# Patient Record
Sex: FEMALE | Race: WHITE | Employment: FULL TIME | URBAN - METROPOLITAN AREA
[De-identification: names, ages, dates, MRNs, and addresses within clinical notes are randomized per-mention and may not be internally consistent; named-entity substitution may affect disease eponyms.]

---

## 2017-01-03 RX ORDER — AMLODIPINE BESYLATE 5 MG/1
TABLET ORAL
Qty: 30 TABLET | Refills: 0 | Status: ON HOLD | OUTPATIENT
Start: 2017-01-03 | End: 2018-04-16

## 2018-04-11 ENCOUNTER — TELEPHONE (OUTPATIENT)
Dept: CARDIOLOGY CLINIC | Age: 63
End: 2018-04-11

## 2018-04-19 ENCOUNTER — TELEPHONE (OUTPATIENT)
Dept: CARDIOLOGY CLINIC | Age: 63
End: 2018-04-19

## 2018-05-18 ENCOUNTER — OFFICE VISIT (OUTPATIENT)
Dept: CARDIOLOGY CLINIC | Age: 63
End: 2018-05-18

## 2018-05-18 VITALS
HEART RATE: 64 BPM | DIASTOLIC BLOOD PRESSURE: 62 MMHG | WEIGHT: 177 LBS | SYSTOLIC BLOOD PRESSURE: 120 MMHG | BODY MASS INDEX: 26.83 KG/M2 | HEIGHT: 68 IN

## 2018-05-18 DIAGNOSIS — I20.1 CORONARY VASOSPASM (HCC): Primary | ICD-10-CM

## 2018-05-18 DIAGNOSIS — E78.2 MIXED HYPERLIPIDEMIA: ICD-10-CM

## 2018-05-18 DIAGNOSIS — I10 ESSENTIAL HYPERTENSION: ICD-10-CM

## 2018-05-18 PROCEDURE — 99214 OFFICE O/P EST MOD 30 MIN: CPT | Performed by: NURSE PRACTITIONER

## 2018-05-18 RX ORDER — DICYCLOMINE HYDROCHLORIDE 10 MG/1
10 CAPSULE ORAL 4 TIMES DAILY PRN
COMMUNITY
End: 2022-01-21 | Stop reason: ALTCHOICE

## 2018-08-16 NOTE — TELEPHONE ENCOUNTER
Medication Refill    When was your last appointment with cardiology?  (if 1year or longer, please schedule an appointment) 5/18/2018    Medication needing refilled:isosorbide mononitrate (IMDUR) 30 MG extended release tablet   Take 1 tablet by mouth daily    Patient want a 30 or 90 day supply called in: 30 day     Which Pharmacy are we sending the medication to: [facility] [address] [place] [address], [address] [phone]

## 2018-08-21 RX ORDER — ISOSORBIDE MONONITRATE 30 MG/1
30 TABLET, EXTENDED RELEASE ORAL DAILY
Qty: 30 TABLET | Refills: 3 | Status: SHIPPED | OUTPATIENT
Start: 2018-08-21 | End: 2018-08-21

## 2018-08-21 RX ORDER — ISOSORBIDE MONONITRATE 30 MG/1
30 TABLET, EXTENDED RELEASE ORAL DAILY
Qty: 90 TABLET | Refills: 3 | Status: SHIPPED | OUTPATIENT
Start: 2018-08-21 | End: 2018-11-20 | Stop reason: DRUGHIGH

## 2018-09-05 ENCOUNTER — TELEPHONE (OUTPATIENT)
Dept: CARDIOLOGY CLINIC | Age: 63
End: 2018-09-05

## 2018-09-05 RX ORDER — AMLODIPINE BESYLATE 2.5 MG/1
2.5 TABLET ORAL DAILY
Qty: 90 TABLET | Refills: 3 | Status: SHIPPED | OUTPATIENT
Start: 2018-09-05 | End: 2018-11-20 | Stop reason: ALTCHOICE

## 2018-11-20 ENCOUNTER — HOSPITAL ENCOUNTER (OUTPATIENT)
Age: 63
Discharge: HOME | End: 2018-11-20
Payer: COMMERCIAL

## 2018-11-20 ENCOUNTER — OFFICE VISIT (OUTPATIENT)
Dept: CARDIOLOGY CLINIC | Age: 63
End: 2018-11-20
Payer: COMMERCIAL

## 2018-11-20 VITALS
SYSTOLIC BLOOD PRESSURE: 112 MMHG | HEIGHT: 68 IN | HEART RATE: 65 BPM | BODY MASS INDEX: 28.51 KG/M2 | WEIGHT: 188.1 LBS | OXYGEN SATURATION: 97 % | DIASTOLIC BLOOD PRESSURE: 62 MMHG

## 2018-11-20 DIAGNOSIS — R53.83 OTHER FATIGUE: ICD-10-CM

## 2018-11-20 DIAGNOSIS — E78.2 MIXED HYPERLIPIDEMIA: ICD-10-CM

## 2018-11-20 DIAGNOSIS — I20.1 CORONARY VASOSPASM (HCC): Primary | ICD-10-CM

## 2018-11-20 DIAGNOSIS — I10 ESSENTIAL HYPERTENSION: ICD-10-CM

## 2018-11-20 LAB
HCT VFR BLD CALC: 39.8 % (ref 36–48)
HEMOGLOBIN: 12.8 G/DL (ref 12–16)
MCH RBC QN AUTO: 28.6 PG (ref 26–34)
MCHC RBC AUTO-ENTMCNC: 32.2 G/DL (ref 31–36)
MCV RBC AUTO: 88.6 FL (ref 80–100)
PDW BLD-RTO: 14.7 % (ref 12.4–15.4)
PLATELET # BLD: 284 K/UL (ref 135–450)
PMV BLD AUTO: 9.2 FL (ref 5–10.5)
RBC # BLD: 4.49 M/UL (ref 4–5.2)
T4 FREE: 1.1 NG/DL (ref 0.9–1.8)
TSH SERPL DL<=0.05 MIU/L-ACNC: 2.8 UIU/ML (ref 0.27–4.2)
WBC # BLD: 4.2 K/UL (ref 4–11)

## 2018-11-20 PROCEDURE — 99214 OFFICE O/P EST MOD 30 MIN: CPT | Performed by: NURSE PRACTITIONER

## 2018-11-20 PROCEDURE — 36415 COLL VENOUS BLD VENIPUNCTURE: CPT

## 2018-11-20 PROCEDURE — 85027 COMPLETE CBC AUTOMATED: CPT

## 2018-11-20 PROCEDURE — 84443 ASSAY THYROID STIM HORMONE: CPT

## 2018-11-20 PROCEDURE — 93000 ELECTROCARDIOGRAM COMPLETE: CPT | Performed by: NURSE PRACTITIONER

## 2018-11-20 PROCEDURE — 84439 ASSAY OF FREE THYROXINE: CPT

## 2018-11-20 RX ORDER — ISOSORBIDE MONONITRATE 30 MG/1
15 TABLET, EXTENDED RELEASE ORAL DAILY
Qty: 90 TABLET | Refills: 3 | Status: SHIPPED
Start: 2018-11-20 | End: 2019-09-06 | Stop reason: SDUPTHER

## 2018-11-20 RX ORDER — IBUPROFEN 800 MG/1
800 TABLET ORAL EVERY 6 HOURS PRN
COMMUNITY

## 2018-11-20 NOTE — PROGRESS NOTES
[address]     Outpatient Follow Up Note    Lissy Thompson is 66 y.o. female who presents today with a history of NQWMI d/t spasm (RCA by cath '11), LBBB, HTN and hyperlipidemia . CHIEF COMPLAINT / HPI:  Follow Up secondary to cor spasm    Subjective:   she denies significant chest pain. Her amlodipine was stopped a few months ago d/t low BP with c/o fatigue. She continues to c/o fatigue by midday. She's had no angina since stopping norvasc. There is no SOB/WASHBURN. The patient denies orthopnea/PND. The patient does not have swelling. The patients weight is stable . The patient is not experiencing palpitations or dizziness. These symptoms are stable since her last OV. With regard to medication therapy the patient has been compliant with prescribed regimen. They have tolerated therapy to date. Past Medical History:   Diagnosis Date    Arthritis     CAD (coronary artery disease)     Angiogram 3/2011 performed DR Duncan Andersen showing ostial RCA spasm. Cath 3/2014 patent coronaries, normal LVEF.      Hyperlipidemia     on statin    Hypotension     Myocardial infarction ([address]     likely secondary to spasm    Noncompliance     with medications in past    Shingles      Social History:    History   Smoking Status    Never Smoker   Smokeless Tobacco    Never Used     Current Medications:  Current Outpatient Prescriptions   Medication Sig Dispense Refill    ibuprofen (ADVIL;MOTRIN) 800 MG tablet Take 800 mg by mouth every 6 hours as needed for Pain      isosorbide mononitrate (IMDUR) 30 MG extended release tablet Take 1 tablet by mouth daily 90 tablet 3    dicyclomine (BENTYL) 10 MG capsule Take 10 mg by mouth 4 times daily (before meals and nightly) prn      vitamin D (CHOLECALCIFEROL) 1000 UNIT TABS tablet Take 3,000 Units by mouth daily      pravastatin (PRAVACHOL) 20 MG tablet TAKE 1 TABLET BY MOUTH DAILY 30 tablet 0    aspirin (ASPIRIN LOW DOSE) 81 MG TBEC TAKE 1 TABLET BY MOUTH EVERY DAY

## 2019-01-18 ENCOUNTER — OFFICE VISIT (OUTPATIENT)
Dept: CARDIOLOGY CLINIC | Age: 64
End: 2019-01-18
Payer: COMMERCIAL

## 2019-01-18 VITALS
SYSTOLIC BLOOD PRESSURE: 120 MMHG | HEART RATE: 64 BPM | HEIGHT: 68 IN | OXYGEN SATURATION: 96 % | DIASTOLIC BLOOD PRESSURE: 72 MMHG | BODY MASS INDEX: 27.81 KG/M2 | WEIGHT: 183.5 LBS

## 2019-01-18 DIAGNOSIS — I10 ESSENTIAL HYPERTENSION: ICD-10-CM

## 2019-01-18 DIAGNOSIS — E78.2 MIXED HYPERLIPIDEMIA: ICD-10-CM

## 2019-01-18 DIAGNOSIS — I20.1 CORONARY VASOSPASM (HCC): Primary | ICD-10-CM

## 2019-01-18 PROCEDURE — 99214 OFFICE O/P EST MOD 30 MIN: CPT | Performed by: NURSE PRACTITIONER

## 2019-03-08 RX ORDER — ISOSORBIDE MONONITRATE 30 MG/1
30 TABLET, EXTENDED RELEASE ORAL DAILY
Qty: 90 TABLET | Refills: 1 | OUTPATIENT
Start: 2019-03-08

## 2019-09-06 RX ORDER — ISOSORBIDE MONONITRATE 30 MG/1
30 TABLET, EXTENDED RELEASE ORAL DAILY
Qty: 90 TABLET | Refills: 3 | Status: SHIPPED | OUTPATIENT
Start: 2019-09-06 | End: 2020-12-01 | Stop reason: SDUPTHER

## 2019-09-06 RX ORDER — ISOSORBIDE MONONITRATE 30 MG/1
15 TABLET, EXTENDED RELEASE ORAL DAILY
Qty: 90 TABLET | Refills: 3 | Status: SHIPPED | OUTPATIENT
Start: 2019-09-06 | End: 2019-09-06

## 2019-09-09 ENCOUNTER — TELEPHONE (OUTPATIENT)
Dept: CARDIOLOGY CLINIC | Age: 64
End: 2019-09-09

## 2019-11-13 ENCOUNTER — OFFICE VISIT (OUTPATIENT)
Dept: CARDIOLOGY CLINIC | Age: 64
End: 2019-11-13
Payer: COMMERCIAL

## 2019-11-13 VITALS
HEART RATE: 67 BPM | DIASTOLIC BLOOD PRESSURE: 60 MMHG | HEIGHT: 68 IN | WEIGHT: 187.4 LBS | SYSTOLIC BLOOD PRESSURE: 108 MMHG | BODY MASS INDEX: 28.4 KG/M2 | OXYGEN SATURATION: 97 %

## 2019-11-13 DIAGNOSIS — I20.1 CORONARY VASOSPASM (HCC): Primary | ICD-10-CM

## 2019-11-13 DIAGNOSIS — I10 ESSENTIAL HYPERTENSION: ICD-10-CM

## 2019-11-13 DIAGNOSIS — Z13.820 SCREENING FOR OSTEOPOROSIS: ICD-10-CM

## 2019-11-13 DIAGNOSIS — E78.2 MIXED HYPERLIPIDEMIA: ICD-10-CM

## 2019-11-13 PROCEDURE — 99214 OFFICE O/P EST MOD 30 MIN: CPT | Performed by: NURSE PRACTITIONER

## 2019-12-05 ENCOUNTER — TELEPHONE (OUTPATIENT)
Dept: CARDIOLOGY CLINIC | Age: 64
End: 2019-12-05

## 2019-12-06 ENCOUNTER — TELEPHONE (OUTPATIENT)
Dept: CARDIOLOGY CLINIC | Age: 64
End: 2019-12-06

## 2020-08-12 ENCOUNTER — TELEPHONE (OUTPATIENT)
Dept: CARDIOLOGY CLINIC | Age: 65
End: 2020-08-12

## 2020-08-12 NOTE — TELEPHONE ENCOUNTER
CARDIAC CLEARANCE     What type of procedure are you having? Hand sx   Which physician is performing your procedure? Dr David Kohli    When is your procedure scheduled for? Not schedule yet  Where are you having this procedure? [facility]  Are you taking Blood Thinners? Aspirin   If so what? (Name/dose/frequesncy)     Does the surgeon want you to stop your blood thinner? YES If so for how long?   Up to cardiologist    Phone Number and Contact Name for Physicians office: [phone]     Fax number to send information: [phone] attn : Chino José

## 2020-08-17 NOTE — TELEPHONE ENCOUNTER
Patient calling again about clearance letter . Can letter be faxed to number given on previous message ? Call patient to let her know this was done.

## 2020-08-17 NOTE — TELEPHONE ENCOUNTER
Spoke to the pt-the surgeon is asking for clearance. She will fax the letter to our office. Please call to make an appointment for clearance.

## 2020-08-18 ENCOUNTER — OFFICE VISIT (OUTPATIENT)
Dept: CARDIOLOGY CLINIC | Age: 65
End: 2020-08-18
Payer: COMMERCIAL

## 2020-08-18 VITALS
BODY MASS INDEX: 29.08 KG/M2 | HEIGHT: 68 IN | OXYGEN SATURATION: 99 % | WEIGHT: 191.9 LBS | SYSTOLIC BLOOD PRESSURE: 120 MMHG | DIASTOLIC BLOOD PRESSURE: 70 MMHG | HEART RATE: 60 BPM

## 2020-08-18 PROCEDURE — 99214 OFFICE O/P EST MOD 30 MIN: CPT | Performed by: NURSE PRACTITIONER

## 2020-08-18 RX ORDER — ASCORBIC ACID 500 MG
500 TABLET ORAL DAILY
COMMUNITY

## 2020-08-18 NOTE — PROGRESS NOTES
[facility]     Outpatient Follow Up Note    Leticia Flanagan is 63 y.o. female who presents today with a history of NQWMI d/t spasm (RCA by cath '11), LBBB, HTN and hyperlipidemia . CHIEF COMPLAINT / HPI:  Follow Up secondary to seeking clearance for orthopedic surgery scheduled for 9/4    Subjective:     Her tendon is no longer supporting her Lt thumb. It pops out of place and is painful. Its all related to her arthritis. she denies significant chest pain. There is no SOB/WASHBURN. The patient denies orthopnea/PND. The patient does not have swelling. The patients weight is stable . The patient is not experiencing palpitations or dizziness. She continues to c/o fatigue by midday however gets up at 3-4 am. She doesn't snore so does not think she has apnea. These symptoms are stable since her last OV. With regard to medication therapy the patient has been compliant with prescribed regimen. They have tolerated therapy to date. Past Medical History:   Diagnosis Date    Arthritis     CAD (coronary artery disease)     Angiogram 3/2011 performed DR Zay Carcamo showing ostial RCA spasm. Cath 3/2014 patent coronaries, normal LVEF.      Hyperlipidemia     on statin    Hypotension     Myocardial infarction ([facility] [address] [facility]     likely secondary to spasm    Noncompliance     with medications in past    Shingles      Social History:    Social History     Tobacco Use   Smoking Status Never Smoker   Smokeless Tobacco Never Used     Current Medications:  Current Outpatient Medications   Medication Sig Dispense Refill    vitamin C (ASCORBIC ACID) 500 MG tablet Take 500 mg by mouth daily QD (UNSURE OF MG)      isosorbide mononitrate (IMDUR) 30 MG extended release tablet TAKE 1 TABLET BY MOUTH DAILY (Patient taking differently: Take 15 mg by mouth daily ) 90 tablet 3    ibuprofen (ADVIL;MOTRIN) 800 MG tablet Take 800 mg by mouth every 6 hours as needed for Pain      dicyclomine (BENTYL) 10 MG capsule Take 10 mg by mouth 4 times daily as needed prn       vitamin D (CHOLECALCIFEROL) 1000 UNIT TABS tablet Take 3,000 Units by mouth daily      pravastatin (PRAVACHOL) 20 MG tablet TAKE 1 TABLET BY MOUTH DAILY 30 tablet 0    aspirin (ASPIRIN LOW DOSE) 81 MG TBEC TAKE 1 TABLET BY MOUTH EVERY DAY 30 tablet 6    therapeutic multivitamin-minerals (THERAGRAN-M) tablet Take 1 tablet by mouth daily. No current facility-administered medications for this visit. REVIEW OF SYSTEMS:    CONSTITUTIONAL: No major weight gain or loss, + midday fatigue, - weakness, night sweats or fever. HEENT: No new vision difficulties or ringing in the ears. RESPIRATORY: No new SOB, PND, orthopnea or cough. CARDIOVASCULAR: See HPI  GI: No nausea, vomiting, diarrhea, constipation, abdominal pain or changes in bowel habits; hx IBS. : No urinary frequency, urgency, incontinence hematuria or dysuria. SKIN: No cyanosis or skin lesions. MUSCULOSKELETAL: No new muscle or joint pain; CLBP. NEUROLOGICAL: No syncope or TIA-like symptoms. PSYCHIATRIC: No anxiety, pain, insomnia or depression    Objective:   PHYSICAL EXAM:        Vitals:    08/18/20 1519 08/18/20 1536   BP: 92/62 120/70   Site: Right Upper Arm    Position: Sitting    Cuff Size: Large Adult    Pulse: 60    SpO2: 99%    Weight: 191 lb 14.4 oz (87 kg)    Height: 5' 8\" (1.727 m)        VITALS:  BP 92/62 (Site: Right Upper Arm, Position: Sitting, Cuff Size: Large Adult)   Pulse 60   Ht 5' 8\" (1.727 m)   Wt 191 lb 14.4 oz (87 kg)   SpO2 99%   BMI 29.18 kg/m²   CONSTITUTIONAL: Cooperative, no apparent distress, and appears well nourished / developed  NEUROLOGIC:  Awake and orientated to person, place and time. PSYCH: Calm affect; very pleasant lady. SKIN: Warm and dry. HEENT: Sclera non-icteric, normocephalic, neck supple, no elevation of JVP, normal carotid pulses with no bruits and thyroid normal size.   LUNGS:  No increased work of breathing and clear to auscultation, no crackles or ~stable : denies chest pain/angina  ~remains on isosorbide  ~neg nuclear stress for ischemia April '18    2. Essential hypertension   ~controlled     3. Mixed hyperlipidemia   ~well controlled by profile from May '19  ~high HDL  ~pravastatin   ~labs followed by PCP        I had the opportunity to review the clinical symptoms and presentation of Anamaria Bueno. Plan:     1. Echocardiogram in the near future (would like to have before anticipated thumb and back surgery)  2. F/U in one year    Overall the patient is stable from CV standpoint    I have addresed the patient's cardiac risk factors and adjusted pharmacologic treatment as needed. In addition, I have reinforced the need for patient directed risk factor modification. Further evaluation will be based upon the patient's clinical course and testing results. All questions and concerns were addressed to the patient. Alternatives to my treatment were discussed. The patient is not currently smoking. The risks related to smoking were reviewed with the patient. Recommend maintaining a smoke-free lifestyle. Patient is not on a beta-blocker: cor spasm  Patient is not on an ace-i/ARB: neg CHF  Patient is on a statin     Antiplatelet therapy has been recommended / prescribed for this patient. Education conducted on adverse reactions including bleeding was discussed. The patient verbalizes understanding not to stop medications without discussing with us. Discussed exercise: 30-60 minutes 7 days/week  Discussed Low saturated fat diet. Thank you for allowing to us to participate in the care of Anamaria Bueno.     LB Rahman    Documentation of today's visit sent to PCP

## 2020-08-20 ENCOUNTER — HOSPITAL ENCOUNTER (OUTPATIENT)
Dept: NON INVASIVE DIAGNOSTICS | Age: 65
Discharge: HOME | End: 2020-08-20
Payer: COMMERCIAL

## 2020-08-20 LAB
LV EF: 55 %
LVEF MODALITY: NORMAL

## 2020-08-20 PROCEDURE — 93306 TTE W/DOPPLER COMPLETE: CPT

## 2020-08-25 ENCOUNTER — TELEPHONE (OUTPATIENT)
Dept: CARDIOLOGY CLINIC | Age: 65
End: 2020-08-25

## 2020-08-25 NOTE — LETTER
[address] [facility] [address] [address]  Phone: [phone]  Fax: [phone]    LB Rahman CNP        August 25, 2020     Patient: Shannon Collado   YOB: 1955   Date of Visit: 8/25/2020       To Whom It May Concern: It is my medical opinion that Deatrice Seats, may proceed with thumb surgery. If you have any questions or concerns, please don't hesitate to call.     Sincerely,        LB Rahman CNP

## 2020-08-25 NOTE — TELEPHONE ENCOUNTER
Calling to get results of echo done last Thursday at [facility] . She is having the EKG she had done last week faxed for NPTS to review before writing clearance letter .  Please fax letter to  thierno Rodríguez fax  attention Den Jimenez

## 2020-12-01 NOTE — TELEPHONE ENCOUNTER
Last ov Ts     1. Coronary vasospasm (HCC)   ~stable : denies chest pain/angina  ~remains on isosorbide  ~neg nuclear stress for ischemia April '18     2. Essential hypertension   ~controlled      3.  Mixed hyperlipidemia   ~well controlled by profile from May '19  ~high HDL  ~pravastatin   ~labs followed by PCP

## 2020-12-02 ENCOUNTER — TELEPHONE (OUTPATIENT)
Dept: CARDIOLOGY CLINIC | Age: 65
End: 2020-12-02

## 2020-12-02 RX ORDER — ISOSORBIDE MONONITRATE 30 MG/1
15 TABLET, EXTENDED RELEASE ORAL SEE ADMIN INSTRUCTIONS
Qty: 45 TABLET | Refills: 3 | Status: SHIPPED | OUTPATIENT
Start: 2020-12-02 | End: 2022-02-08

## 2020-12-02 NOTE — TELEPHONE ENCOUNTER
Pharmacy calling for direction on Imdur 30 mg  Tablets. Per NPTS she is to take 0.5 mg tablets twice daily. Called and spoke with pharmacist and informed him of the directions.

## 2020-12-17 ENCOUNTER — TELEPHONE (OUTPATIENT)
Dept: CARDIOLOGY CLINIC | Age: 65
End: 2020-12-17

## 2020-12-17 NOTE — TELEPHONE ENCOUNTER
CARDIAC CLEARANCE     What type of procedure are you having? Left SI Joint Fixation    Which physician is performing your procedure? Dr Yogi Pepper    When is your procedure scheduled for? Not yet ki looking to do first of 2021    Where are you having this procedure? [facility]    Are you taking Blood Thinners? Yes   If so what? ASA (Name/dose/frequesncy)     Does the surgeon want you to stop your blood thinner? If so for how long?     Phone Number and Contact Name for Physicians office: [phone]    Fax number to send information: [phone]

## 2021-01-20 ENCOUNTER — TELEPHONE (OUTPATIENT)
Dept: CARDIOLOGY CLINIC | Age: 66
End: 2021-01-20

## 2021-01-20 NOTE — TELEPHONE ENCOUNTER
Last office note 8/18/20:    Assessment:        Diagnosis Orders   1. Coronary vasospasm (HCC)   ~stable : denies chest pain/angina  ~remains on isosorbide  ~neg nuclear stress for ischemia April '18     2. Essential hypertension   ~controlled      3.  Mixed hyperlipidemia   ~well controlled by profile from May '19  ~high HDL  ~pravastatin   ~labs followed by PCP        She was also requesting clearance in August

## 2021-01-20 NOTE — TELEPHONE ENCOUNTER
CARDIAC CLEARANCE     What type of procedure are you having? SI Fusion    Which physician is performing your procedure? Dr Jennifer De Oliveira    When is your procedure scheduled for? 02/2/2021    Where are you having this procedure? [facility]    Are you taking Blood Thinners? yes   If so what? ASA (Name/dose/frequesncy)     Does the surgeon want you to stop your blood thinner? If so for how long?  Needs to stop 5-7 days prior    Phone Number and Contact Name for Physicians office: [phone]    Fax number to send information: [phone]

## 2021-01-22 ENCOUNTER — TELEPHONE (OUTPATIENT)
Dept: CARDIOLOGY CLINIC | Age: 66
End: 2021-01-22

## 2021-01-22 NOTE — TELEPHONE ENCOUNTER
[facility] is sending a new clearance paper work over because the other states sx is in patient, but procedure is going to be out patient and they need new  Clearance form filled out signed and faxed back. Any questions please call.

## 2022-01-21 ENCOUNTER — OFFICE VISIT (OUTPATIENT)
Dept: CARDIOLOGY CLINIC | Age: 67
End: 2022-01-21
Payer: COMMERCIAL

## 2022-01-21 VITALS
SYSTOLIC BLOOD PRESSURE: 110 MMHG | DIASTOLIC BLOOD PRESSURE: 78 MMHG | WEIGHT: 191 LBS | HEART RATE: 65 BPM | BODY MASS INDEX: 28.95 KG/M2 | OXYGEN SATURATION: 98 % | HEIGHT: 68 IN

## 2022-01-21 DIAGNOSIS — I10 ESSENTIAL HYPERTENSION: ICD-10-CM

## 2022-01-21 DIAGNOSIS — I20.1 CORONARY VASOSPASM (HCC): Primary | ICD-10-CM

## 2022-01-21 DIAGNOSIS — E78.2 MIXED HYPERLIPIDEMIA: ICD-10-CM

## 2022-01-21 PROCEDURE — 99214 OFFICE O/P EST MOD 30 MIN: CPT | Performed by: NURSE PRACTITIONER

## 2022-01-21 PROCEDURE — 93000 ELECTROCARDIOGRAM COMPLETE: CPT | Performed by: NURSE PRACTITIONER

## 2022-01-21 RX ORDER — PRAVASTATIN SODIUM 20 MG
TABLET ORAL
Qty: 90 TABLET | Refills: 3 | Status: SHIPPED | OUTPATIENT
Start: 2022-01-21
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
## 2022-09-21 RX ORDER — ISOSORBIDE MONONITRATE 30 MG/1
TABLET, EXTENDED RELEASE ORAL
Qty: 45 TABLET | Refills: 1 | Status: SHIPPED | OUTPATIENT
Start: 2022-09-21

## 2024-03-18 NOTE — COMMUNICATION BODY
Attempted to call the patient's daughter but phone number is not working.    kg/m²    CONSTITUTIONAL: Cooperative, no apparent distress, and appears well nourished / developed  NEUROLOGIC:  Awake and orientated to person, place and time. PSYCH: Calm affect. SKIN: Warm and dry. HEENT: Sclera non-icteric, normocephalic, neck supple, no elevation of JVP, normal carotid pulses with no bruits and thyroid normal size. LUNGS:  No increased work of breathing and clear to auscultation, no crackles or wheezing  CARDIOVASCULAR:  Regular rate 68 and rhythm with no murmurs, gallops, rubs, or abnormal heart sounds, normal PMI. The apical impulses not displaced  JVP less than 8 cm H2O  Heart tones are crisp and normal  Cervical veins are not engorged  The carotid upstroke is normal in amplitude and contour without delay or bruit  JVP is not elevated  ABDOMEN:  Normal bowel sounds, non-distended and non-tender to palpation  EXT: No edema, no calf tenderness. Pulses are present bilaterally. DATA:      Radiology Review:  Pertinent images / reports were reviewed as a part of this visit and reveals the following:    Last Echo: 4/16/18:  Summary   -Normal left ventricle size, wall thickness, and systolic function with an estimated ejection fraction of 50-55%.  -Mild mitral regurgitation.   -Trivial aortic regurgitation.   -Mild tricuspid regurgitation with a RVSP of 33 mmHg. Last Stress Test: 4/16/18:  Summary    Normal LV size and systolic function.    Small sized anterior fixed defect of mild intensity consistent with    infarction in the territory of the distal LAD .    Normal LV size and systolic function. Assessment:      Diagnosis Orders   1. Coronary vasospasm (HCC)   ~denies recurrence of atypical CP   ~neg nuclear stress for ischemia April   ~tolerating isosorbide    2. Essential hypertension   ~controlled to lower normal. norvasc stopped for a BP 96/ and c/o fatigue. Fatigue unchanged     3. Mixed hyperlipidemia   ~well controlled with high HDL  ~pravastatin     4.       Fatigue